# Patient Record
Sex: FEMALE | URBAN - METROPOLITAN AREA
[De-identification: names, ages, dates, MRNs, and addresses within clinical notes are randomized per-mention and may not be internally consistent; named-entity substitution may affect disease eponyms.]

---

## 2020-11-21 ENCOUNTER — HOSPITAL ENCOUNTER (EMERGENCY)
Age: 30
Discharge: HOME | End: 2020-11-21
Payer: COMMERCIAL

## 2020-11-21 VITALS
HEART RATE: 77 BPM | WEIGHT: 229.06 LBS | BODY MASS INDEX: 36.81 KG/M2 | OXYGEN SATURATION: 100 % | HEIGHT: 66 IN | RESPIRATION RATE: 18 BRPM | SYSTOLIC BLOOD PRESSURE: 104 MMHG | TEMPERATURE: 98.4 F | DIASTOLIC BLOOD PRESSURE: 70 MMHG

## 2020-11-21 LAB
RAPID INFLUENZA  B AGN: NEGATIVE
RAPID INFLUENZA A AGN: NEGATIVE

## 2020-11-21 PROCEDURE — U0003 INFECTIOUS AGENT DETECTION BY NUCLEIC ACID (DNA OR RNA); SEVERE ACUTE RESPIRATORY SYNDROME CORONAVIRUS 2 (SARS-COV-2) (CORONAVIRUS DISEASE [COVID-19]), AMPLIFIED PROBE TECHNIQUE, MAKING USE OF HIGH THROUGHPUT TECHNOLOGIES AS DESCRIBED BY CMS-2020-01-R: HCPCS

## 2020-11-21 PROCEDURE — 87804 INFLUENZA ASSAY W/OPTIC: CPT

## 2020-11-21 PROCEDURE — 99284 EMERGENCY DEPT VISIT MOD MDM: CPT

## 2020-11-21 ASSESSMENT — PAIN DESCRIPTION - DESCRIPTORS: DESCRIPTORS: ACHING

## 2020-11-21 ASSESSMENT — PAIN SCALES - GENERAL
PAINLEVEL_OUTOF10: 4
PAINLEVEL_OUTOF10: 0

## 2020-11-21 ASSESSMENT — PAIN - FUNCTIONAL ASSESSMENT: PAIN_FUNCTIONAL_ASSESSMENT: ACTIVITIES ARE NOT PREVENTED

## 2020-11-21 ASSESSMENT — PAIN DESCRIPTION - ONSET: ONSET: ON-GOING

## 2020-11-21 ASSESSMENT — PAIN DESCRIPTION - LOCATION: LOCATION: GENERALIZED

## 2020-11-21 ASSESSMENT — PAIN DESCRIPTION - PAIN TYPE: TYPE: ACUTE PAIN

## 2020-11-21 ASSESSMENT — PAIN DESCRIPTION - PROGRESSION: CLINICAL_PROGRESSION: NOT CHANGED

## 2020-11-21 ASSESSMENT — PAIN DESCRIPTION - FREQUENCY: FREQUENCY: INTERMITTENT

## 2020-11-21 ASSESSMENT — PAIN DESCRIPTION - ORIENTATION: ORIENTATION: RIGHT;LEFT

## 2020-11-21 NOTE — ED PROVIDER NOTES
[address]       Pt Name: Maryann Huizar  MRN: [id number]  [place] 1990  Date of evaluation: 11/21/2020  Provider: SHAUNA Murry    The ED Attending Physician was available for consultation but did not see or evaluate this patient. CHIEF COMPLAINT       Chief Complaint   Patient presents with    Generalized Body Aches     x 2 days with congestion, headaches         HISTORY OF PRESENT ILLNESS  (Location/Symptom, Timing/Onset, Context/Setting, Quality, Duration, Modifying Factors, Severity.)   Maryann Huizar is a 31 y.o. female who presents to the emergency department with the complaint of fatigue, generalized body aches, sensation of chest pressure, headache. Patient says symptoms have been going on for couple of days now. She denies any confirmed exposure to COVID-19, but she attends the [employer] and has been in classes lately, has been notified that some other students have tested positive. She says she has not gotten the flu shot this season. Denies any recorded fever. Reports a mild nonproductive cough, as well. Denies any shortness of breath. Denies abdominal pain, nausea or vomiting. Denies any chance of pregnancy. Denies any known medical problems or other recent illness. No other complaints. Nursing Notes were reviewed and I agree. REVIEW OF SYSTEMS    (2-9 systems for level 4, 10 or more for level 5)     Constitutional:  Negative for fever, chills. Positive for generalized body aches, fatigue. ENT: Negative for congestion, rhinorrhea, sneezing, sore throat. Respiratory: Positive for mild nonproductive cough, chest tightness Negative for shortness of breath. Cardiovascular:  Negative for chest pain, palpitations. Gastrointestinal:  Negative for nausea, vomiting, abdominal pain. Genitourinary:  Negative for dysuria, hematuria, flank pain, and pelvic pain.    Musculoskeletal:  Negative for myalgias, arthralgias, neck pain and neck stiffness. Neurological: Positive for headache. Negative for dizziness, weakness, light-headedness, numbness. Except as noted above the remainder of the review of systems was reviewed and negative. PAST MEDICAL HISTORY         Diagnosis Date    Abnormal Pap smear of cervix        SURGICAL HISTORY     History reviewed. No pertinent surgical history. CURRENT MEDICATIONS       Previous Medications    DOXYLAMINE-PYRIDOXINE (DICLEGIS PO)    Take by mouth       ALLERGIES     Aspirin    FAMILY HISTORY           Problem Relation Age of Onset    Arthritis Mother     High Blood Pressure Mother     Asthma Mother     Other Mother     High Blood Pressure Father     Arthritis Father     Cancer Maternal Aunt     Cancer Maternal Uncle     Asthma Brother      Family Status   Relation Name Status    Mother  (Not Specified)    Father  (Not Specified)    [address]      Brother  (Not Specified)        SOCIAL HISTORY      reports that she has never smoked. She has never used smokeless tobacco. She reports that she does not drink alcohol or use drugs. PHYSICAL EXAM    (up to 7 for level 4, 8 or more for level 5)     ED Triage Vitals [20 0959]   BP Temp Temp Source Pulse Resp SpO2 Height Weight   104/70 98.4 °F (36.9 °C) Oral 77 18 100 % 5' 6\" (1.676 m) 229 lb 0.9 oz (103.9 kg)       Constitutional:  Appearing well-developed and well-nourished. No distress. HENT:  Normocephalic and atraumatic. Oropharynx is clear and moist.  Conjunctivae and EOM normal bilaterally. PERRLA. Negative for cervical adenopathy. Cardiovascular:  Normal rate, regular rhythm, normal heart sounds and intact distal pulses. Pulmonary/Chest:  Effort normal and breath sounds normal. No respiratory distress. Musculoskeletal:  Normal range of motion. No edema exhibited. Neurological:  Oriented to person, place, and time. No cranial nerve deficit. Skin:  Skin is warm and dry.  Not diaphoretic. Psychiatric:  Normal mood, affect, behavior, judgment and thought content. DIAGNOSTIC RESULTS     RADIOLOGY:   Non-plain film images such as CT, Ultrasound and MRI are read by the radiologist. Plain radiographic images are visualized and preliminarily interpreted by SHAUNA Freitas with the below findings:    None. Interpretation per the Radiologist below, if available at the time of this note:    No orders to display       LABS:  Labs Reviewed   RAPID INFLUENZA A/B ANTIGENS    Narrative:     Performed at:  [address] [address]   Phone ([phone]       All other labs were within normal range or not returned as of this dictation. EMERGENCY DEPARTMENT COURSE and DIFFERENTIAL DIAGNOSIS/MDM:   Vitals:    Vitals:    11/21/20 0959   BP: 104/70   Pulse: 77   Resp: 18   Temp: 98.4 °F (36.9 °C)   TempSrc: Oral   SpO2: 100%   Weight: 229 lb 0.9 oz (103.9 kg)   Height: 5' 6\" (1.676 m)       The patient's condition in the ED was good, the patient was afebrile and nontoxic in appearance, and the patient's physical exam was unremarkable. The patient oxygenated well on room air and showed no signs of respiratory distress. Lungs were clear to auscultation. Rapid flu swab was negative. COVID-19 test result is pending, and patient will be notified if results is positive. She will be advised to isolate in the meantime. There was no indication for hospitalization or further workup. The patient will be discharged with contact information for obtaining a a primary care provider. The patient verbalized understanding and agreement with this plan of care. The patient was advised to return to the emergency department if symptoms should significantly worsen or if new and concerning symptoms should appear.      I estimate there is LOW risk for PNEUMONIA, MENINGITIS, PERITONSILLAR ABSCESS, SEPSIS, MALIGNANT OTITIS EXTERNA, OR EPIGLOTTITIS thus I consider the discharge disposition reasonable. Management decisions relating to this patient encounter were made during the NCTJI-38 public health emergency. At this point in time, the risk associated with hospital admission or further ED observation/treatment of this patient is deemed to be greater than the risk of discharge. I engaged in a shared decision-making conversation with the patient. The patient agrees and wishes to go home. The patient was specifically advised that their symptoms are consistent with possible COVID-19 infection, and they need to self-isolate at home and restrict any activities outside of their home except for seeking medical care. The patient was provided specific instructions related to COVID-19, along with recommendations for proper respiratory hygiene and instructions on prevention of transmission of infection to others. The patient was counseled to closely monitor their symptoms and to return immediately to the Emergency Department for any difficulty breathing, confusion, dizziness or passing out, vomiting, chest pain, high fevers, weakness, or any other new or concerning symptoms. There is no evidence of emergent medical condition at this time, and the patient will be discharged in good condition. PROCEDURES:  None    FINAL IMPRESSION      1. Acute viral syndrome    2.  Suspected COVID-19 virus infection          DISPOSITION/PLAN   DISPOSITION        PATIENT REFERRED TO:   [address]  [phone]  Call   to get a family doctor      DISCHARGE MEDICATIONS:  New Prescriptions    No medications on file       (Please note that portions of this note were completed with a voice recognition program.  Efforts were made to edit the dictations but occasionally words are mis-transcribed.)    Paul Mcknight, [address]  11/21/20 3723

## 2020-11-22 LAB — SARS-COV-2, PCR: NOT DETECTED

## 2023-09-27 ENCOUNTER — HOSPITAL ENCOUNTER (EMERGENCY)
Age: 33
Discharge: HOME | End: 2023-09-27
Attending: EMERGENCY MEDICINE
Payer: COMMERCIAL

## 2023-09-27 VITALS
OXYGEN SATURATION: 99 % | HEART RATE: 91 BPM | TEMPERATURE: 99.1 F | RESPIRATION RATE: 20 BRPM | DIASTOLIC BLOOD PRESSURE: 88 MMHG | SYSTOLIC BLOOD PRESSURE: 137 MMHG | HEIGHT: 66 IN | BODY MASS INDEX: 38.41 KG/M2 | WEIGHT: 238.98 LBS

## 2023-09-27 DIAGNOSIS — R20.2 FACIAL PARESTHESIA: ICD-10-CM

## 2023-09-27 DIAGNOSIS — N30.00 ACUTE CYSTITIS WITHOUT HEMATURIA: ICD-10-CM

## 2023-09-27 DIAGNOSIS — M54.40 ACUTE RIGHT-SIDED LOW BACK PAIN WITH SCIATICA, SCIATICA LATERALITY UNSPECIFIED: Primary | ICD-10-CM

## 2023-09-27 DIAGNOSIS — R51.9 NONINTRACTABLE HEADACHE, UNSPECIFIED CHRONICITY PATTERN, UNSPECIFIED HEADACHE TYPE: ICD-10-CM

## 2023-09-27 LAB
BACTERIA URNS QL MICRO: ABNORMAL /HPF
BILIRUB UR QL STRIP.AUTO: NEGATIVE
CLARITY UR: CLEAR
COLOR UR: YELLOW
EPI CELLS #/AREA URNS HPF: ABNORMAL /HPF (ref 0–5)
GLUCOSE UR STRIP.AUTO-MCNC: NEGATIVE MG/DL
HCG UR QL: NEGATIVE
HGB UR QL STRIP.AUTO: NEGATIVE
KETONES UR STRIP.AUTO-MCNC: NEGATIVE MG/DL
LEUKOCYTE ESTERASE UR QL STRIP.AUTO: ABNORMAL
NITRITE UR QL STRIP.AUTO: NEGATIVE
PH UR STRIP.AUTO: 6 [PH] (ref 5–8)
PROT UR STRIP.AUTO-MCNC: NEGATIVE MG/DL
RBC #/AREA URNS HPF: ABNORMAL /HPF (ref 0–4)
SP GR UR STRIP.AUTO: 1.02 (ref 1–1.03)
UA COMPLETE W REFLEX CULTURE PNL UR: YES
UA DIPSTICK W REFLEX MICRO PNL UR: YES
URN SPEC COLLECT METH UR: ABNORMAL
UROBILINOGEN UR STRIP-ACNC: 1 E.U./DL
WBC #/AREA URNS HPF: ABNORMAL /HPF (ref 0–5)

## 2023-09-27 PROCEDURE — 81001 URINALYSIS AUTO W/SCOPE: CPT

## 2023-09-27 PROCEDURE — 99283 EMERGENCY DEPT VISIT LOW MDM: CPT

## 2023-09-27 PROCEDURE — 84703 CHORIONIC GONADOTROPIN ASSAY: CPT

## 2023-09-27 PROCEDURE — 87086 URINE CULTURE/COLONY COUNT: CPT

## 2023-09-27 RX ORDER — METHOCARBAMOL 500 MG/1
500 TABLET, FILM COATED ORAL 4 TIMES DAILY PRN
Qty: 40 TABLET | Refills: 0 | Status: SHIPPED | OUTPATIENT
Start: 2023-09-27 | End: 2023-10-07

## 2023-09-27 RX ORDER — CEPHALEXIN 500 MG/1
500 CAPSULE ORAL 3 TIMES DAILY
Qty: 21 CAPSULE | Refills: 0 | Status: SHIPPED | OUTPATIENT
Start: 2023-09-27 | End: 2023-10-04

## 2023-09-27 RX ORDER — METHYLPREDNISOLONE 4 MG/1
TABLET ORAL
Qty: 1 KIT | Refills: 0 | Status: SHIPPED | OUTPATIENT
Start: 2023-09-27

## 2023-09-27 RX ORDER — LIDOCAINE 50 MG/G
1 PATCH TOPICAL DAILY
Qty: 10 PATCH | Refills: 0 | Status: SHIPPED | OUTPATIENT
Start: 2023-09-27 | End: 2023-10-07

## 2023-09-27 ASSESSMENT — PAIN - FUNCTIONAL ASSESSMENT: PAIN_FUNCTIONAL_ASSESSMENT: NONE - DENIES PAIN

## 2023-09-27 ASSESSMENT — PAIN SCALES - GENERAL: PAINLEVEL_OUTOF10: 3

## 2023-09-27 NOTE — ED TRIAGE NOTES
Pt presents to ED with c/o of R hip pain radiating to R lower leg for past 4 days. Pt denies injury, pt states she woke up with R hip pain. Pt also c/o of headache with tingling to bilat cheeks for 1 hour. Pt states she has hx of migraines. No meds prior to arrrival. Pt denies sick contacts. Pt rates headache 3/10.

## 2023-09-28 NOTE — ED NOTES
Patient ambulatory from ED. AVS provided and discussed with patient. All questions answered. Patient verbalizes understanding of discharge instructions. Respirations even and easy. No obvious distress at this time.       [address], [place]  09/27/23 2049

## 2023-09-28 NOTE — ED PROVIDER NOTES
edit the dictations but occasionally words are mis-transcribed. )    [address] DO (electronically signed)  Attending Emergency Physician           Jody Kennedy DO  09/27/23 2037

## 2023-09-29 LAB — BACTERIA UR CULT: NORMAL
